# Patient Record
Sex: FEMALE | Race: BLACK OR AFRICAN AMERICAN | NOT HISPANIC OR LATINO | Employment: UNEMPLOYED | URBAN - METROPOLITAN AREA
[De-identification: names, ages, dates, MRNs, and addresses within clinical notes are randomized per-mention and may not be internally consistent; named-entity substitution may affect disease eponyms.]

---
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
## 2020-11-09 PROCEDURE — 99284 EMERGENCY DEPT VISIT MOD MDM: CPT | Mod: ,,, | Performed by: EMERGENCY MEDICINE

## 2020-11-09 PROCEDURE — 99284 PR EMERGENCY DEPT VISIT,LEVEL IV: ICD-10-PCS | Mod: ,,, | Performed by: EMERGENCY MEDICINE

## 2020-11-09 PROCEDURE — 81025 URINE PREGNANCY TEST: CPT | Performed by: EMERGENCY MEDICINE

## 2020-11-09 PROCEDURE — 99284 EMERGENCY DEPT VISIT MOD MDM: CPT

## 2020-11-10 ENCOUNTER — HOSPITAL ENCOUNTER (EMERGENCY)
Facility: HOSPITAL | Age: 20
Discharge: HOME | End: 2020-11-10
Attending: EMERGENCY MEDICINE
Payer: MEDICAID

## 2020-11-10 VITALS
BODY MASS INDEX: 25.2 KG/M2 | DIASTOLIC BLOOD PRESSURE: 55 MMHG | OXYGEN SATURATION: 100 % | HEART RATE: 88 BPM | WEIGHT: 125 LBS | HEIGHT: 59 IN | RESPIRATION RATE: 16 BRPM | TEMPERATURE: 98 F | SYSTOLIC BLOOD PRESSURE: 114 MMHG

## 2020-11-10 DIAGNOSIS — N30.01 ACUTE CYSTITIS WITH HEMATURIA: Primary | ICD-10-CM

## 2020-11-10 LAB
B-HCG UR QL: NEGATIVE
BACTERIA #/AREA URNS AUTO: ABNORMAL /HPF
BILIRUB UR QL STRIP: NEGATIVE
CLARITY UR REFRACT.AUTO: ABNORMAL
COLOR UR AUTO: ABNORMAL
CTP QC/QA: YES
GLUCOSE UR QL STRIP: NEGATIVE
HGB UR QL STRIP: ABNORMAL
HYALINE CASTS UR QL AUTO: 0 /LPF
KETONES UR QL STRIP: NEGATIVE
LEUKOCYTE ESTERASE UR QL STRIP: ABNORMAL
MICROSCOPIC COMMENT: ABNORMAL
NITRITE UR QL STRIP: NEGATIVE
PH UR STRIP: 7 [PH] (ref 5–8)
PROT UR QL STRIP: ABNORMAL
RBC #/AREA URNS AUTO: >100 /HPF (ref 0–4)
SP GR UR STRIP: 1.02 (ref 1–1.03)
SQUAMOUS #/AREA URNS AUTO: 3 /HPF
URN SPEC COLLECT METH UR: ABNORMAL
WBC #/AREA URNS AUTO: 35 /HPF (ref 0–5)

## 2020-11-10 PROCEDURE — 25000003 PHARM REV CODE 250: Performed by: EMERGENCY MEDICINE

## 2020-11-10 PROCEDURE — 87077 CULTURE AEROBIC IDENTIFY: CPT

## 2020-11-10 PROCEDURE — 81001 URINALYSIS AUTO W/SCOPE: CPT

## 2020-11-10 PROCEDURE — 87086 URINE CULTURE/COLONY COUNT: CPT

## 2020-11-10 PROCEDURE — 87186 SC STD MICRODIL/AGAR DIL: CPT

## 2020-11-10 PROCEDURE — 87088 URINE BACTERIA CULTURE: CPT

## 2020-11-10 RX ORDER — PHENAZOPYRIDINE HYDROCHLORIDE 200 MG/1
200 TABLET, FILM COATED ORAL 3 TIMES DAILY
Qty: 6 TABLET | Refills: 0 | Status: SHIPPED | OUTPATIENT
Start: 2020-11-10 | End: 2020-11-12

## 2020-11-10 RX ORDER — SULFAMETHOXAZOLE AND TRIMETHOPRIM 800; 160 MG/1; MG/1
1 TABLET ORAL
Status: COMPLETED | OUTPATIENT
Start: 2020-11-10 | End: 2020-11-10

## 2020-11-10 RX ORDER — SULFAMETHOXAZOLE AND TRIMETHOPRIM 800; 160 MG/1; MG/1
1 TABLET ORAL 2 TIMES DAILY
Qty: 14 TABLET | Refills: 0 | Status: SHIPPED | OUTPATIENT
Start: 2020-11-10 | End: 2020-11-17

## 2020-11-10 RX ORDER — PHENAZOPYRIDINE HYDROCHLORIDE 200 MG/1
200 TABLET, FILM COATED ORAL
Status: COMPLETED | OUTPATIENT
Start: 2020-11-10 | End: 2020-11-10

## 2020-11-10 RX ADMIN — PHENAZOPYRIDINE HYDROCHLORIDE 200 MG: 200 TABLET ORAL at 01:11

## 2020-11-10 RX ADMIN — SULFAMETHOXAZOLE AND TRIMETHOPRIM 1 TABLET: 800; 160 TABLET ORAL at 01:11

## 2020-11-10 NOTE — DISCHARGE INSTRUCTIONS
Drink plenty of fluids  Take medicine as directed, the Bactrim is the antibiotic, and the pyridium will help decrease the pain you experience when you are urinating  Return to the emergency room for fever back pain  Follow up their primary care physician if the symptoms are not better in 2-3 days

## 2020-11-10 NOTE — ED TRIAGE NOTES
Patient arrives via POV from home for urinary frequency and pressure-like pain in her pelvic area.   Prior to arrival meds: none    LOC: The patient is awake, alert and is behaving appropriately.  APPEARANCE: Patient in no acute distress.  SKIN: The skin is warm, dry, and intact, color consistent with ethnicity.   MUSCULOSKELETAL: Patient moving all extremities well, no obvious swelling or deformities noted.   RESPIRATORY: Airway is open and patent, respirations even and unlabored, no accessory muscle use noted. Breath sounds clear. Denies cough  CARDIAC: Patient has a normal rate, no periphreal edema noted, capillary refill < 2 seconds. Pulses 2+.   ABDOMEN: Abdomen soft, non-distended. Bowel sounds active in all quadrants. Denies nausea or vomiting. Denies diarrhea or constipation. Complains of lower abd/pelvic pain and urinary frequency. States her period ended on the 4th but that she has had some spotting since having intercourse on the 7th.   NEUROLOGIC: Awake and alert. PERRL, behavior appropriate to situation, facial expression symmetrical, bilateral hand grasp equal and even, purposeful motor response noted.

## 2020-11-10 NOTE — LETTER
November 16, 2020    Lindsey Sosa  [address] [place] [address]                   [address]  [facility] [address]  Phone: [phone]  Fax: [phone]   Dear Ms. Lindsey Sosa:    Recent results from your visit require your attention. Please call us back at your earliest convenience at [phone].    Sincerely,        Joellen Romo PA-C

## 2020-11-10 NOTE — ED PROVIDER NOTES
Encounter Date: 11/9/2020       History     Chief Complaint   Patient presents with    Dysuria     pt reports pressure with urinating that started today. + lower abdominal pain. denies N/V/D     Chief complaint:  Dysuria    History present illness:  This is a usually healthy 20-year-old young woman who presents emergency room with a several hour history of dysuria, urgency, frequency, and hematuria.  She said she was fine before 7:00 p.m..  She has had no fever.  She denies any back pain.  She denies vaginal discharge.    Last menstrual period was October 30th through the 4th.  She has had intercourse since then but had no problems with intercourse including pain.  Again no vaginal discharge been noted she has had no fever or chills.    Past medical history:  Hospitalizations:  None  Surgeries:  None  Allergies:  None  Medications:  None  Immunizations:  Up-to-date    Social history she has graduated from high school.  She is currently working at [employer].  She denies drug or alcohol use.            Review of patient's allergies indicates:  No Known Allergies  History reviewed. No pertinent past medical history.  History reviewed. No pertinent surgical history.  Family History   Problem Relation Age of Onset    Cancer Neg Hx      Social History     Tobacco Use    Smoking status: Never Smoker    Smokeless tobacco: Never Used   Substance Use Topics    Alcohol use: No    Drug use: Yes     Types: Marijuana     Comment: OCCASIONAL     Review of Systems   Constitutional: Negative for activity change, appetite change, chills and fever.   HENT: Negative for congestion, ear pain, rhinorrhea and sore throat.    Eyes: Negative for pain and discharge.   Respiratory: Negative for cough, shortness of breath and wheezing.    Cardiovascular: Negative for chest pain and palpitations.   Gastrointestinal: Positive for abdominal pain. Negative for diarrhea, nausea and vomiting.        Suprapubic pain   Genitourinary: Positive  for dysuria, frequency, hematuria and urgency. Negative for difficulty urinating, vaginal discharge and vaginal pain.   Musculoskeletal: Negative for arthralgias, back pain and neck pain.   Skin: Negative for rash.   Neurological: Negative for dizziness, weakness and light-headedness.       Physical Exam     Initial Vitals [11/09/20 2351]   BP Pulse Resp Temp SpO2   (!) 114/55 88 16 98.2 °F (36.8 °C) 100 %      MAP       --         Physical Exam    Nursing note and vitals reviewed.  Constitutional: She appears well-developed and well-nourished. She is not diaphoretic. No distress.   HENT:   Head: Normocephalic.   Right Ear: External ear normal.   Left Ear: External ear normal.   Nose: Nose normal.   Mouth/Throat: Oropharynx is clear and moist.   Eyes: Conjunctivae and EOM are normal. Pupils are equal, round, and reactive to light.   Neck: Normal range of motion. Neck supple.   Cardiovascular: Normal rate. Exam reveals no gallop and no friction rub.    No murmur heard.  Pulmonary/Chest: Breath sounds normal. No respiratory distress. She has no wheezes. She has no rhonchi. She has no rales.   Abdominal: Soft. She exhibits no distension and no mass. There is abdominal tenderness. There is no rebound and no guarding.   Suprapubic tenderness to palpation   Musculoskeletal: Normal range of motion. No tenderness or edema.   Neurological: She is alert. She has normal strength. No cranial nerve deficit or sensory deficit. GCS score is 15. GCS eye subscore is 4. GCS verbal subscore is 5. GCS motor subscore is 6.   Skin: Skin is warm and dry. Capillary refill takes less than 2 seconds.         ED Course   Procedures  Labs Reviewed   URINALYSIS, REFLEX TO URINE CULTURE - Abnormal; Notable for the following components:       Result Value    Color, UA Red (*)     Appearance, UA Cloudy (*)     Protein, UA 2+ (*)     Occult Blood UA 3+ (*)     Leukocytes, UA 2+ (*)     All other components within normal limits    Narrative:      Specimen Source->Urine   URINALYSIS MICROSCOPIC - Abnormal; Notable for the following components:    RBC, UA >100 (*)     WBC, UA 35 (*)     All other components within normal limits    Narrative:     Specimen Source->Urine   CULTURE, URINE   POCT URINE PREGNANCY          Imaging Results    None          Medical Decision Making:   Initial Assessment:   Problem 1.:  Dysuria:  History physical is most consistent with urinary tract infection.  Urinalysis reveals greater than 100 red blood cells per high-power field and 35 white blood cells per high-power field, though nitrate negative.  However, given her history, physical exam, and lab findings, I still feel this most likely with UTI and the patient will be treated with Bactrim.  She is given the 1st dose in the emergency room.    I doubt pyelonephritis is an issue given the recent onset of symptoms, lack of back pain, and lack of fever.  Differential Diagnosis:   UTI, vaginal infection, upper tract UTI, vaginitis  Clinical Tests:   Lab Tests: Ordered and Reviewed  The following lab test(s) were unremarkable: UPT                             Clinical Impression:     ICD-10-CM ICD-9-CM   1. Acute cystitis with hematuria  N30.01 595.0                          ED Disposition Condition    Discharge Stable        ED Prescriptions     Medication Sig Dispense Start Date End Date Auth. Provider    sulfamethoxazole-trimethoprim 800-160mg (BACTRIM DS) 800-160 mg Tab Take 1 tablet by mouth 2 (two) times daily. for 7 days 14 tablet 11/10/2020 11/17/2020 Janelle Leyva MD    phenazopyridine (PYRIDIUM) 200 MG tablet Take 1 tablet (200 mg total) by mouth 3 (three) times daily. for 2 days 6 tablet 11/10/2020 11/12/2020 Janelle Leyva MD        Follow-up Information     Follow up With Specialties Details Why Contact Info    Shabnam Bacon MD Pediatrics  As needed, if symptoms no better in 2-3 days [address] [facility]  [address]  [place] [address] [phone]                                          Janelle Leyva MD  11/10/20 0406

## 2020-11-11 LAB — BACTERIA UR CULT: ABNORMAL

## 2020-12-02 ENCOUNTER — HOSPITAL ENCOUNTER (EMERGENCY)
Facility: HOSPITAL | Age: 20
Discharge: HOME | End: 2020-12-02
Attending: INTERNAL MEDICINE
Payer: MEDICAID

## 2020-12-02 VITALS
HEIGHT: 59 IN | OXYGEN SATURATION: 99 % | WEIGHT: 121 LBS | BODY MASS INDEX: 24.39 KG/M2 | HEART RATE: 100 BPM | DIASTOLIC BLOOD PRESSURE: 58 MMHG | SYSTOLIC BLOOD PRESSURE: 112 MMHG | TEMPERATURE: 100 F | RESPIRATION RATE: 18 BRPM

## 2020-12-02 DIAGNOSIS — N10 ACUTE PYELONEPHRITIS: Primary | ICD-10-CM

## 2020-12-02 LAB
B-HCG UR QL: NEGATIVE
BILIRUBIN, POC UA: NEGATIVE
BLOOD, POC UA: ABNORMAL
CLARITY, POC UA: CLEAR
COLOR, POC UA: YELLOW
CTP QC/QA: YES
GLUCOSE, POC UA: NEGATIVE
KETONES, POC UA: ABNORMAL
LEUKOCYTE EST, POC UA: ABNORMAL
NITRITE, POC UA: NEGATIVE
PH UR STRIP: 6 [PH]
PROTEIN, POC UA: NEGATIVE
SPECIFIC GRAVITY, POC UA: 1.02
UROBILINOGEN, POC UA: 0.2 E.U./DL

## 2020-12-02 PROCEDURE — 25000003 PHARM REV CODE 250: Mod: ER | Performed by: INTERNAL MEDICINE

## 2020-12-02 PROCEDURE — 99284 EMERGENCY DEPT VISIT MOD MDM: CPT | Mod: 25,ER

## 2020-12-02 PROCEDURE — 96372 THER/PROPH/DIAG INJ SC/IM: CPT | Mod: ER

## 2020-12-02 PROCEDURE — 81025 URINE PREGNANCY TEST: CPT | Mod: ER | Performed by: INTERNAL MEDICINE

## 2020-12-02 PROCEDURE — 81003 URINALYSIS AUTO W/O SCOPE: CPT | Mod: ER

## 2020-12-02 PROCEDURE — 63600175 PHARM REV CODE 636 W HCPCS: Mod: ER | Performed by: INTERNAL MEDICINE

## 2020-12-02 RX ORDER — SULFAMETHOXAZOLE AND TRIMETHOPRIM 800; 160 MG/1; MG/1
1 TABLET ORAL 2 TIMES DAILY
Qty: 20 TABLET | Refills: 0 | Status: SHIPPED | OUTPATIENT
Start: 2020-12-02 | End: 2020-12-12

## 2020-12-02 RX ORDER — KETOROLAC TROMETHAMINE 10 MG/1
10 TABLET, FILM COATED ORAL 3 TIMES DAILY PRN
Qty: 10 TABLET | Refills: 0 | OUTPATIENT
Start: 2020-12-02 | End: 2021-11-16

## 2020-12-02 RX ORDER — KETOROLAC TROMETHAMINE 10 MG/1
10 TABLET, FILM COATED ORAL
Status: COMPLETED | OUTPATIENT
Start: 2020-12-02 | End: 2020-12-02

## 2020-12-02 RX ORDER — CEFTRIAXONE 1 G/1
1 INJECTION, POWDER, FOR SOLUTION INTRAMUSCULAR; INTRAVENOUS
Status: COMPLETED | OUTPATIENT
Start: 2020-12-02 | End: 2020-12-02

## 2020-12-02 RX ADMIN — CEFTRIAXONE SODIUM 1 G: 1 INJECTION, POWDER, FOR SOLUTION INTRAMUSCULAR; INTRAVENOUS at 05:12

## 2020-12-02 RX ADMIN — KETOROLAC TROMETHAMINE 10 MG: 10 TABLET, FILM COATED ORAL at 06:12

## 2020-12-02 NOTE — ED PROVIDER NOTES
Encounter Date: 12/2/2020    SCRIBE #1 NOTE: I, Sandy Interiano, am scribing for, and in the presence of,  Dr. Huber. I have scribed the following portions of the note - Other sections scribed: HPI, ROS, PE.       History     Chief Complaint   Patient presents with    Flank Pain     left flank pain x 1 week     Lindsey Sosa is a 20 y.o. female who presents to the ED complaining of left sided flank pain for x1 week.    The history is provided by the patient. No  was used.     Review of patient's allergies indicates:  No Known Allergies  History reviewed. No pertinent past medical history.  History reviewed. No pertinent surgical history.  Family History   Problem Relation Age of Onset    Cancer Neg Hx      Social History     Tobacco Use    Smoking status: Never Smoker    Smokeless tobacco: Never Used   Substance Use Topics    Alcohol use: No    Drug use: Yes     Types: Marijuana     Comment: OCCASIONAL     Review of Systems   Constitutional: Negative for chills and fever.   HENT: Negative for sore throat.    Respiratory: Negative for shortness of breath.    Cardiovascular: Negative for chest pain.   Gastrointestinal: Negative for nausea.   Genitourinary: Positive for flank pain. Negative for dysuria.   Musculoskeletal: Negative for back pain.   Skin: Negative for rash.   Neurological: Negative for weakness.   Hematological: Does not bruise/bleed easily.   All other systems reviewed and are negative.      Physical Exam     Initial Vitals [12/02/20 1734]   BP Pulse Resp Temp SpO2   111/63 98 18 99.7 °F (37.6 °C) 96 %      MAP       --         Physical Exam    Nursing note and vitals reviewed.  Constitutional: She appears well-developed and well-nourished.   HENT:   Head: Normocephalic and atraumatic.   Right Ear: External ear normal.   Left Ear: External ear normal.   Eyes: Conjunctivae and EOM are normal.   Neck: Normal range of motion. Neck supple.   Cardiovascular: Normal rate, regular  rhythm and normal heart sounds. Exam reveals no gallop and no friction rub.    No murmur heard.  Pulmonary/Chest: Breath sounds normal. No respiratory distress. She has no wheezes. She has no rhonchi. She has no rales.   Abdominal: Soft. There is no abdominal tenderness.   Musculoskeletal: Tenderness (Mild left CVA tenderness) present. No edema.   Neurological: She is alert and oriented to person, place, and time. GCS score is 15. GCS eye subscore is 4. GCS verbal subscore is 5. GCS motor subscore is 6.   Skin: Skin is warm and dry.   Psychiatric: She has a normal mood and affect.         ED Course   Procedures  Labs Reviewed   POCT URINALYSIS W/O SCOPE - Abnormal; Notable for the following components:       Result Value    Ketones, UA Trace (*)     Blood, UA 1+ (*)     Leukocytes, UA 1+ (*)     All other components within normal limits   POCT URINALYSIS W/O SCOPE   POCT URINE PREGNANCY          Imaging Results    None          Medical Decision Making:   Initial Assessment:   Lindsey Sosa is a 20 y.o. female who presents to the ED complaining of left sided flank pain for x1 week.  Clinical Tests:   Lab Tests: Ordered and Reviewed  ED Management:  Urinalysis reveals signs of infection.  Patient was given instructions for acute pyelonephritis and advised follow up with her primary care physician within the next 3 days for re-evaluation/return to the emergency department if condition worsens.  Prescriptions for Toradol/Bactrim were given and patient received Rocephin/Toradol in the emergency department.            Scribe Attestation:   Scribe #1: I performed the above scribed service and the documentation accurately describes the services I performed. I attest to the accuracy of the note.    This document was produced by a scribe under my direction and in my presence. I agree with the content of the note and have made any necessary edits.     Dr. Huber    12/02/2020 6:07 PM                    Clinical Impression:      ICD-10-CM ICD-9-CM   1. Acute pyelonephritis  N10 590.10                   1. Acute pyelonephritis            ED Disposition Condition    Discharge Stable        ED Prescriptions     Medication Sig Dispense Start Date End Date Auth. Provider    ketorolac (TORADOL) 10 mg tablet Take 1 tablet (10 mg total) by mouth 3 (three) times daily as needed for Pain. 10 tablet 12/2/2020  Abdon Huber MD    sulfamethoxazole-trimethoprim 800-160mg (BACTRIM DS) 800-160 mg Tab Take 1 tablet by mouth 2 (two) times daily. for 10 days 20 tablet 12/2/2020 12/12/2020 Abdon Huber MD        Follow-up Information     Follow up With Specialties Details Why Contact Info    Shabnam Bacon MD Pediatrics Schedule an appointment as soon as possible for a visit in 2 days For reevaluation [address] [facility]  [address] [place] [id number]  [phone]                                         Abdon Huber MD  12/02/20 7121

## 2020-12-02 NOTE — ED NOTES
Pt to ed with c/o L flank pain for one week. Denies trouble urinating. Denies n/v. Denies known trauma. Pt c/o loose bowels for approx 4 days. Denies abdominal pain. resp even and unlabored. Pt states pain increases with movement. Pt has no other complaints. Will continue to monitor

## 2021-01-18 ENCOUNTER — HOSPITAL ENCOUNTER (EMERGENCY)
Facility: HOSPITAL | Age: 21
Discharge: HOME | End: 2021-01-18
Attending: PEDIATRICS
Payer: MEDICAID

## 2021-01-18 VITALS
HEART RATE: 80 BPM | SYSTOLIC BLOOD PRESSURE: 111 MMHG | OXYGEN SATURATION: 97 % | BODY MASS INDEX: 24.39 KG/M2 | TEMPERATURE: 99 F | RESPIRATION RATE: 18 BRPM | HEIGHT: 59 IN | WEIGHT: 121 LBS | DIASTOLIC BLOOD PRESSURE: 52 MMHG

## 2021-01-18 DIAGNOSIS — Z20.2 STD EXPOSURE: Primary | ICD-10-CM

## 2021-01-18 DIAGNOSIS — N89.8 VAGINAL DISCHARGE: ICD-10-CM

## 2021-01-18 LAB
B-HCG UR QL: NEGATIVE
BACTERIA #/AREA URNS AUTO: ABNORMAL /HPF
BACTERIA GENITAL QL WET PREP: ABNORMAL
BILIRUB UR QL STRIP: NEGATIVE
CLARITY UR REFRACT.AUTO: ABNORMAL
CLUE CELLS VAG QL WET PREP: ABNORMAL
COLOR UR AUTO: YELLOW
CTP QC/QA: YES
FILAMENT FUNGI VAG WET PREP-#/AREA: ABNORMAL
GLUCOSE UR QL STRIP: NEGATIVE
HGB UR QL STRIP: NEGATIVE
KETONES UR QL STRIP: NEGATIVE
LEUKOCYTE ESTERASE UR QL STRIP: ABNORMAL
MICROSCOPIC COMMENT: ABNORMAL
NITRITE UR QL STRIP: NEGATIVE
PH UR STRIP: 5 [PH] (ref 5–8)
PROT UR QL STRIP: NEGATIVE
RBC #/AREA URNS AUTO: 3 /HPF (ref 0–4)
SP GR UR STRIP: 1.02 (ref 1–1.03)
SPECIMEN SOURCE: ABNORMAL
SQUAMOUS #/AREA URNS AUTO: 7 /HPF
T VAGINALIS GENITAL QL WET PREP: ABNORMAL
URN SPEC COLLECT METH UR: ABNORMAL
WBC #/AREA URNS AUTO: 48 /HPF (ref 0–5)
WBC #/AREA VAG WET PREP: ABNORMAL
YEAST GENITAL QL WET PREP: ABNORMAL

## 2021-01-18 PROCEDURE — 81025 URINE PREGNANCY TEST: CPT | Performed by: STUDENT IN AN ORGANIZED HEALTH CARE EDUCATION/TRAINING PROGRAM

## 2021-01-18 PROCEDURE — 87086 URINE CULTURE/COLONY COUNT: CPT

## 2021-01-18 PROCEDURE — 99284 EMERGENCY DEPT VISIT MOD MDM: CPT | Mod: 25

## 2021-01-18 PROCEDURE — 63700000 PHARM REV CODE 250 ALT 637 W/O HCPCS: Performed by: STUDENT IN AN ORGANIZED HEALTH CARE EDUCATION/TRAINING PROGRAM

## 2021-01-18 PROCEDURE — 99284 PR EMERGENCY DEPT VISIT,LEVEL IV: ICD-10-PCS | Mod: ,,, | Performed by: PEDIATRICS

## 2021-01-18 PROCEDURE — 63600175 PHARM REV CODE 636 W HCPCS: Performed by: STUDENT IN AN ORGANIZED HEALTH CARE EDUCATION/TRAINING PROGRAM

## 2021-01-18 PROCEDURE — 96372 THER/PROPH/DIAG INJ SC/IM: CPT

## 2021-01-18 PROCEDURE — 99284 EMERGENCY DEPT VISIT MOD MDM: CPT | Mod: ,,, | Performed by: PEDIATRICS

## 2021-01-18 PROCEDURE — 87210 SMEAR WET MOUNT SALINE/INK: CPT

## 2021-01-18 PROCEDURE — 86703 HIV-1/HIV-2 1 RESULT ANTBDY: CPT

## 2021-01-18 PROCEDURE — 81001 URINALYSIS AUTO W/SCOPE: CPT

## 2021-01-18 PROCEDURE — 86592 SYPHILIS TEST NON-TREP QUAL: CPT

## 2021-01-18 RX ORDER — AZITHROMYCIN 250 MG/1
1000 TABLET, FILM COATED ORAL
Status: COMPLETED | OUTPATIENT
Start: 2021-01-18 | End: 2021-01-18

## 2021-01-18 RX ORDER — CEFTRIAXONE 2 G/1
500 INJECTION, POWDER, FOR SOLUTION INTRAMUSCULAR; INTRAVENOUS
Status: COMPLETED | OUTPATIENT
Start: 2021-01-18 | End: 2021-01-18

## 2021-01-18 RX ADMIN — AZITHROMYCIN MONOHYDRATE 1000 MG: 250 TABLET ORAL at 05:01

## 2021-01-18 RX ADMIN — CEFTRIAXONE SODIUM 500 MG: 2 INJECTION, POWDER, FOR SOLUTION INTRAMUSCULAR; INTRAVENOUS at 05:01

## 2021-01-19 LAB
BACTERIA UR CULT: NORMAL
BACTERIA UR CULT: NORMAL
RPR SER QL: NORMAL

## 2021-01-20 LAB — HIV1+2 IGG SERPL QL IA.RAPID: NORMAL

## 2021-07-28 PROCEDURE — 99282 EMERGENCY DEPT VISIT SF MDM: CPT

## 2021-07-28 PROCEDURE — 99282 PR EMERGENCY DEPT VISIT,LEVEL II: ICD-10-PCS | Mod: CS,,, | Performed by: EMERGENCY MEDICINE

## 2021-07-28 PROCEDURE — 99282 EMERGENCY DEPT VISIT SF MDM: CPT | Mod: CS,,, | Performed by: EMERGENCY MEDICINE

## 2021-07-29 ENCOUNTER — HOSPITAL ENCOUNTER (EMERGENCY)
Facility: HOSPITAL | Age: 21
Discharge: HOME | End: 2021-07-29
Attending: EMERGENCY MEDICINE
Payer: MEDICAID

## 2021-07-29 VITALS
OXYGEN SATURATION: 100 % | RESPIRATION RATE: 18 BRPM | SYSTOLIC BLOOD PRESSURE: 124 MMHG | TEMPERATURE: 98 F | HEIGHT: 59 IN | DIASTOLIC BLOOD PRESSURE: 64 MMHG | HEART RATE: 97 BPM | WEIGHT: 124 LBS | BODY MASS INDEX: 25 KG/M2

## 2021-07-29 DIAGNOSIS — Z20.822 COVID-19 VIRUS NOT DETECTED: Primary | ICD-10-CM

## 2021-07-29 LAB
CTP QC/QA: YES
SARS-COV-2 RDRP RESP QL NAA+PROBE: NEGATIVE

## 2021-07-29 PROCEDURE — U0002 COVID-19 LAB TEST NON-CDC: HCPCS | Performed by: EMERGENCY MEDICINE

## 2021-11-15 VITALS
DIASTOLIC BLOOD PRESSURE: 66 MMHG | TEMPERATURE: 99 F | SYSTOLIC BLOOD PRESSURE: 128 MMHG | BODY MASS INDEX: 23.6 KG/M2 | HEART RATE: 78 BPM | OXYGEN SATURATION: 99 % | WEIGHT: 116.88 LBS | RESPIRATION RATE: 14 BRPM

## 2021-11-15 PROCEDURE — 99284 PR EMERGENCY DEPT VISIT,LEVEL IV: ICD-10-PCS | Mod: ,,, | Performed by: EMERGENCY MEDICINE

## 2021-11-15 PROCEDURE — 99284 EMERGENCY DEPT VISIT MOD MDM: CPT | Mod: ,,, | Performed by: EMERGENCY MEDICINE

## 2021-11-15 PROCEDURE — 96372 THER/PROPH/DIAG INJ SC/IM: CPT

## 2021-11-15 PROCEDURE — 99284 EMERGENCY DEPT VISIT MOD MDM: CPT | Mod: 25

## 2021-11-16 ENCOUNTER — HOSPITAL ENCOUNTER (EMERGENCY)
Facility: HOSPITAL | Age: 21
Discharge: HOME | End: 2021-11-16
Attending: EMERGENCY MEDICINE
Payer: MEDICAID

## 2021-11-16 DIAGNOSIS — Z20.2 STD EXPOSURE: Primary | ICD-10-CM

## 2021-11-16 LAB
B-HCG UR QL: NEGATIVE
BACTERIA #/AREA URNS AUTO: ABNORMAL /HPF
BILIRUB UR QL STRIP: NEGATIVE
CLARITY UR REFRACT.AUTO: ABNORMAL
COLOR UR AUTO: YELLOW
CTP QC/QA: YES
GLUCOSE UR QL STRIP: NEGATIVE
HGB UR QL STRIP: NEGATIVE
KETONES UR QL STRIP: ABNORMAL
LEUKOCYTE ESTERASE UR QL STRIP: NEGATIVE
MICROSCOPIC COMMENT: ABNORMAL
NITRITE UR QL STRIP: NEGATIVE
PH UR STRIP: 7 [PH] (ref 5–8)
PROT UR QL STRIP: NEGATIVE
RBC #/AREA URNS AUTO: 5 /HPF (ref 0–4)
SP GR UR STRIP: 1.03 (ref 1–1.03)
SQUAMOUS #/AREA URNS AUTO: 11 /HPF
URN SPEC COLLECT METH UR: ABNORMAL
WBC #/AREA URNS AUTO: 5 /HPF (ref 0–5)

## 2021-11-16 PROCEDURE — 87491 CHLMYD TRACH DNA AMP PROBE: CPT | Performed by: EMERGENCY MEDICINE

## 2021-11-16 PROCEDURE — 63600175 PHARM REV CODE 636 W HCPCS: Performed by: EMERGENCY MEDICINE

## 2021-11-16 PROCEDURE — 87591 N.GONORRHOEAE DNA AMP PROB: CPT | Performed by: EMERGENCY MEDICINE

## 2021-11-16 PROCEDURE — 81001 URINALYSIS AUTO W/SCOPE: CPT | Performed by: EMERGENCY MEDICINE

## 2021-11-16 PROCEDURE — 81025 URINE PREGNANCY TEST: CPT | Performed by: EMERGENCY MEDICINE

## 2021-11-16 RX ORDER — CEFTRIAXONE 500 MG/1
500 INJECTION, POWDER, FOR SOLUTION INTRAMUSCULAR; INTRAVENOUS
Status: COMPLETED | OUTPATIENT
Start: 2021-11-16 | End: 2021-11-16

## 2021-11-16 RX ORDER — DOXYCYCLINE 100 MG/1
100 CAPSULE ORAL 2 TIMES DAILY
Qty: 14 CAPSULE | Refills: 0 | Status: SHIPPED | OUTPATIENT
Start: 2021-11-16 | End: 2021-11-23

## 2021-11-16 RX ADMIN — CEFTRIAXONE SODIUM 500 MG: 500 INJECTION, POWDER, FOR SOLUTION INTRAMUSCULAR; INTRAVENOUS at 01:11

## 2021-11-18 LAB
C TRACH DNA SPEC QL NAA+PROBE: DETECTED
N GONORRHOEA DNA SPEC QL NAA+PROBE: NOT DETECTED

## 2021-11-19 ENCOUNTER — TELEPHONE (OUTPATIENT)
Dept: EMERGENCY MEDICINE | Facility: HOSPITAL | Age: 21
End: 2021-11-19
Payer: MEDICAID

## 2022-03-14 ENCOUNTER — HOSPITAL ENCOUNTER (EMERGENCY)
Facility: HOSPITAL | Age: 22
Discharge: HOME | End: 2022-03-14
Attending: EMERGENCY MEDICINE
Payer: MEDICAID

## 2022-03-14 VITALS
RESPIRATION RATE: 18 BRPM | WEIGHT: 114 LBS | DIASTOLIC BLOOD PRESSURE: 51 MMHG | OXYGEN SATURATION: 99 % | SYSTOLIC BLOOD PRESSURE: 106 MMHG | TEMPERATURE: 98 F | HEART RATE: 83 BPM | BODY MASS INDEX: 23.03 KG/M2

## 2022-03-14 DIAGNOSIS — N76.0 BACTERIAL VAGINITIS: ICD-10-CM

## 2022-03-14 DIAGNOSIS — R10.2 PELVIC PAIN: Primary | ICD-10-CM

## 2022-03-14 DIAGNOSIS — B96.89 BACTERIAL VAGINITIS: ICD-10-CM

## 2022-03-14 LAB
B-HCG UR QL: NEGATIVE
BACTERIA GENITAL QL WET PREP: ABNORMAL
BILIRUB UR QL STRIP: NEGATIVE
CLARITY UR: CLEAR
CLUE CELLS VAG QL WET PREP: ABNORMAL
COLOR UR: YELLOW
CTP QC/QA: YES
FILAMENT FUNGI VAG WET PREP-#/AREA: ABNORMAL
GLUCOSE UR QL STRIP: NEGATIVE
HGB UR QL STRIP: NEGATIVE
KETONES UR QL STRIP: ABNORMAL
LEUKOCYTE ESTERASE UR QL STRIP: NEGATIVE
NITRITE UR QL STRIP: NEGATIVE
PH UR STRIP: 8 [PH] (ref 5–8)
PROT UR QL STRIP: ABNORMAL
SP GR UR STRIP: 1.02 (ref 1–1.03)
SPECIMEN SOURCE: ABNORMAL
T VAGINALIS GENITAL QL WET PREP: ABNORMAL
URN SPEC COLLECT METH UR: ABNORMAL
UROBILINOGEN UR STRIP-ACNC: NEGATIVE EU/DL
WBC #/AREA VAG WET PREP: ABNORMAL
YEAST GENITAL QL WET PREP: ABNORMAL

## 2022-03-14 PROCEDURE — 81003 URINALYSIS AUTO W/O SCOPE: CPT | Performed by: PHYSICIAN ASSISTANT

## 2022-03-14 PROCEDURE — 87591 N.GONORRHOEAE DNA AMP PROB: CPT | Performed by: PHYSICIAN ASSISTANT

## 2022-03-14 PROCEDURE — 96372 THER/PROPH/DIAG INJ SC/IM: CPT | Performed by: PHYSICIAN ASSISTANT

## 2022-03-14 PROCEDURE — 87210 SMEAR WET MOUNT SALINE/INK: CPT | Performed by: PHYSICIAN ASSISTANT

## 2022-03-14 PROCEDURE — 25000003 PHARM REV CODE 250: Performed by: PHYSICIAN ASSISTANT

## 2022-03-14 PROCEDURE — 99284 EMERGENCY DEPT VISIT MOD MDM: CPT | Mod: 25

## 2022-03-14 PROCEDURE — 63600175 PHARM REV CODE 636 W HCPCS: Performed by: PHYSICIAN ASSISTANT

## 2022-03-14 PROCEDURE — 87491 CHLMYD TRACH DNA AMP PROBE: CPT | Performed by: PHYSICIAN ASSISTANT

## 2022-03-14 PROCEDURE — 81025 URINE PREGNANCY TEST: CPT | Performed by: PHYSICIAN ASSISTANT

## 2022-03-14 RX ORDER — METRONIDAZOLE 500 MG/1
500 TABLET ORAL EVERY 12 HOURS
Qty: 14 TABLET | Refills: 0 | Status: SHIPPED | OUTPATIENT
Start: 2022-03-14 | End: 2022-03-21

## 2022-03-14 RX ORDER — DOXYCYCLINE HYCLATE 100 MG
100 TABLET ORAL
Status: COMPLETED | OUTPATIENT
Start: 2022-03-14 | End: 2022-03-14

## 2022-03-14 RX ORDER — PHENAZOPYRIDINE HYDROCHLORIDE 200 MG/1
200 TABLET, FILM COATED ORAL
Qty: 12 TABLET | Refills: 0 | Status: SHIPPED | OUTPATIENT
Start: 2022-03-14 | End: 2022-03-18

## 2022-03-14 RX ORDER — DOXYCYCLINE 100 MG/1
100 CAPSULE ORAL 2 TIMES DAILY
Qty: 14 CAPSULE | Refills: 0 | Status: SHIPPED | OUTPATIENT
Start: 2022-03-14 | End: 2022-03-21

## 2022-03-14 RX ORDER — LIDOCAINE HYDROCHLORIDE 10 MG/ML
10 INJECTION INFILTRATION; PERINEURAL
Status: COMPLETED | OUTPATIENT
Start: 2022-03-14 | End: 2022-03-14

## 2022-03-14 RX ORDER — CEFTRIAXONE 1 G/1
1 INJECTION, POWDER, FOR SOLUTION INTRAMUSCULAR; INTRAVENOUS
Status: COMPLETED | OUTPATIENT
Start: 2022-03-14 | End: 2022-03-14

## 2022-03-14 RX ADMIN — LIDOCAINE HYDROCHLORIDE 10 ML: 10 INJECTION, SOLUTION INFILTRATION; PERINEURAL at 04:03

## 2022-03-14 RX ADMIN — CEFTRIAXONE 1 G: 1 INJECTION, POWDER, FOR SOLUTION INTRAMUSCULAR; INTRAVENOUS at 04:03

## 2022-03-14 RX ADMIN — DOXYCYCLINE HYCLATE 100 MG: 100 TABLET, COATED ORAL at 04:03

## 2022-03-14 NOTE — Clinical Note
"Lindsey OBREGON"Lacey Sosa was seen and treated in our emergency department on 3/14/2022.  She may return to work on 03/14/2022.       If you have any questions or concerns, please don't hesitate to call.      Mitra ACOSTA    "

## 2022-03-14 NOTE — DISCHARGE INSTRUCTIONS

## 2022-03-14 NOTE — ED PROVIDER NOTES
Encounter Date: 3/14/2022    SCRIBE #1 NOTE: I, Isi Miller, am scribing for, and in the presence of,  Davon Oliver PA-C. I have scribed the following portions of the note - Other sections scribed: HPI, ROS, PE.       History     Chief Complaint   Patient presents with    Abdominal Pain     Patient has been abd pain for two weeks.  Patient states the pain got worse after intercourse.  Positive for vaginal discharge, no vaginal bleeding.      Lindsey Sosa is a 22 y.o female with no pertinent PMHx presenting to the ED with a chief complaint of abdominal pain onset one week ago. The patient complains of generalized lower abdominal pain with associated frequency and increased pain during sexual intercourse. States she was diagnosed with Chlamydia in January and was prescribed Azithromycin. Denies concern for STD's at this time and additionally denies new sexual partners. No past bladder infections. Patient denies vaginal discharge, dysuria, fever, nausea, and vomiting. No other associated symptoms.     The history is provided by the patient. No  was used.     Review of patient's allergies indicates:  No Known Allergies  History reviewed. No pertinent past medical history.  History reviewed. No pertinent surgical history.  Family History   Problem Relation Age of Onset    Cancer Neg Hx      Social History     Tobacco Use    Smoking status: Current Every Day Smoker    Smokeless tobacco: Never Used    Tobacco comment: black and milds daily   Substance Use Topics    Alcohol use: Yes     Comment: occasionally    Drug use: Yes     Types: Marijuana     Comment: daily     Review of Systems   Constitutional: Negative for fever.   HENT: Negative for sore throat.    Eyes: Negative for visual disturbance.   Respiratory: Negative for shortness of breath.    Cardiovascular: Negative for chest pain.   Gastrointestinal: Positive for abdominal pain. Negative for nausea and vomiting.   Genitourinary:  Positive for frequency. Negative for dysuria and vaginal discharge.   Musculoskeletal: Negative for back pain.   Skin: Negative for rash.   Neurological: Negative for headaches.   All other systems reviewed and are negative.      Physical Exam     Initial Vitals [03/14/22 1530]   BP Pulse Resp Temp SpO2   (!) 109/51 89 18 98.3 °F (36.8 °C) 100 %      MAP       --         Physical Exam    Nursing note and vitals reviewed.  Constitutional: She appears well-developed and well-nourished. She is not diaphoretic. No distress.   HENT:   Head: Normocephalic and atraumatic.   Nose: Nose normal.   Eyes: Conjunctivae and EOM are normal. Right eye exhibits no discharge. Left eye exhibits no discharge.   Neck: No tracheal deviation present. No JVD present.   Normal range of motion.  Cardiovascular: Normal rate, regular rhythm and normal heart sounds. Exam reveals no friction rub.    No murmur heard.  Pulmonary/Chest: Breath sounds normal. No stridor. No respiratory distress. She has no wheezes. She has no rhonchi. She has no rales. She exhibits no tenderness.   Abdominal: Abdomen is soft. Bowel sounds are normal. She exhibits no distension. There is no abdominal tenderness. There is no rebound, no guarding, no tenderness at McBurney's point and negative Middleton's sign.   Genitourinary:    Genitourinary Comments: Performed by [facility]. Thick white vaginal discharge. No friability. No CMT or adnexal TTP.      Musculoskeletal:         General: No tenderness or edema. Normal range of motion.      Cervical back: Normal range of motion.     Neurological: She is alert and oriented to person, place, and time.   Skin: Skin is warm and dry. No rash and no abscess noted. No erythema. No pallor.   Psychiatric: She has a normal mood and affect. Thought content normal.         ED Course   Procedures  Labs Reviewed   VAGINAL SCREEN - Abnormal; Notable for the following components:       Result Value    Clue Cells Occasional (*)      WBC - Vaginal Screen Many (*)     Bacteria - Vaginal Screen Many (*)     All other components within normal limits    Narrative:     Release to patient->Immediate   URINALYSIS, REFLEX TO URINE CULTURE - Abnormal; Notable for the following components:    Protein, UA Trace (*)     Ketones, UA Trace (*)     All other components within normal limits    Narrative:     Specimen Source->Urine   C. TRACHOMATIS/N. GONORRHOEAE BY AMP DNA   C. TRACHOMATIS/N. GONORRHOEAE BY AMP DNA   POCT URINE PREGNANCY          Imaging Results    None          Medications   cefTRIAXone injection 1 g (1 g Intramuscular Given 3/14/22 1639)   LIDOcaine HCL 10 mg/ml (1%) injection 10 mL (10 mLs Infiltration Given 3/14/22 1639)   doxycycline tablet 100 mg (100 mg Oral Given 3/14/22 1639)     Medical Decision Making:   History:   Old Medical Records: I decided to obtain old medical records.  Clinical Tests:   Lab Tests: Ordered and Reviewed  ED Management:  Chlamydia positive 4-5 months ago and treated with azithromycin. No doxycyline use; will repeat GC for possible azithromycin resistance.  No UTI but does endorse urinary symptoms; may have urethritis.  No clinical evidence of PID but given duration of symptoms and recent history of STD, I will treat empirically for possible early/developing PID.  No convincing evidence for TOA.  No systemic symptoms.  Nonsurgical abdomen.  Also appears to have BV. Advising close follow-up with OBGYN.  We discussed safe sexual practices.  Strict return precautions discussed with patient who is agreeable to the plan.          Scribe Attestation:   Scribe #1: I performed the above scribed service and the documentation accurately describes the services I performed. I attest to the accuracy of the note.                 Clinical Impression:   Final diagnoses:  [R10.2] Pelvic pain (Primary)  [N76.0, B96.89] Bacterial vaginitis        I, Davon Oliver PA-C, personally performed the services described in this  documentation. All medical record entries made by the scribe were at my direction and in my presence. I have reviewed the chart and agree that the record reflects my personal performance and is accurate and complete.       ED Disposition Condition    Discharge Stable        ED Prescriptions     Medication Sig Dispense Start Date End Date Auth. Provider    metroNIDAZOLE (FLAGYL) 500 MG tablet Take 1 tablet (500 mg total) by mouth every 12 (twelve) hours. for 7 days 14 tablet 3/14/2022 3/21/2022 Davon Oliver PA-C    doxycycline (VIBRAMYCIN) 100 MG Cap Take 1 capsule (100 mg total) by mouth 2 (two) times daily. for 7 days 14 capsule 3/14/2022 3/21/2022 Davon Oliver PA-C    phenazopyridine (PYRIDIUM) 200 MG tablet Take 1 tablet (200 mg total) by mouth 3 (three) times daily with meals. for 4 days 12 tablet 3/14/2022 3/18/2022 Davon Oliver PA-C        Follow-up Information     Follow up With Specialties Details Why Contact Info    Fili Christensen MD Obstetrics and Gynecology Schedule an appointment as soon as possible for a visit in 1 day For further evaluation, For more definitive management of your symptoms [address]  [address]  [facility] [address]  [phone]      [facility] Emergency Dept Emergency Medicine Go to  If symptoms worsen [address]  [facility] [id number] [phone]           Davon Oliver PA-C  03/14/22 6273

## 2022-03-17 LAB
C TRACH DNA SPEC QL NAA+PROBE: DETECTED
C TRACH DNA SPEC QL NAA+PROBE: DETECTED
N GONORRHOEA DNA SPEC QL NAA+PROBE: NOT DETECTED
N GONORRHOEA DNA SPEC QL NAA+PROBE: NOT DETECTED

## 2022-05-10 ENCOUNTER — HOSPITAL ENCOUNTER (EMERGENCY)
Facility: HOSPITAL | Age: 22
Discharge: HOME | End: 2022-05-10
Attending: EMERGENCY MEDICINE
Payer: MEDICAID

## 2022-05-10 VITALS
HEIGHT: 59 IN | OXYGEN SATURATION: 99 % | BODY MASS INDEX: 23.79 KG/M2 | WEIGHT: 118 LBS | DIASTOLIC BLOOD PRESSURE: 59 MMHG | TEMPERATURE: 99 F | HEART RATE: 103 BPM | SYSTOLIC BLOOD PRESSURE: 112 MMHG | RESPIRATION RATE: 17 BRPM

## 2022-05-10 DIAGNOSIS — J06.9 VIRAL URI WITH COUGH: Primary | ICD-10-CM

## 2022-05-10 LAB
B-HCG UR QL: POSITIVE
CTP QC/QA: YES
POC MOLECULAR INFLUENZA A AGN: NEGATIVE
POC MOLECULAR INFLUENZA B AGN: NEGATIVE
SARS-COV-2 RDRP RESP QL NAA+PROBE: NEGATIVE

## 2022-05-10 PROCEDURE — U0003 INFECTIOUS AGENT DETECTION BY NUCLEIC ACID (DNA OR RNA); SEVERE ACUTE RESPIRATORY SYNDROME CORONAVIRUS 2 (SARS-COV-2) (CORONAVIRUS DISEASE [COVID-19]), AMPLIFIED PROBE TECHNIQUE, MAKING USE OF HIGH THROUGHPUT TECHNOLOGIES AS DESCRIBED BY CMS-2020-01-R: HCPCS | Performed by: EMERGENCY MEDICINE

## 2022-05-10 PROCEDURE — 87502 INFLUENZA DNA AMP PROBE: CPT

## 2022-05-10 PROCEDURE — 99283 EMERGENCY DEPT VISIT LOW MDM: CPT | Mod: 25

## 2022-05-10 PROCEDURE — 81025 URINE PREGNANCY TEST: CPT | Performed by: EMERGENCY MEDICINE

## 2022-05-10 PROCEDURE — U0005 INFEC AGEN DETEC AMPLI PROBE: HCPCS | Performed by: EMERGENCY MEDICINE

## 2022-05-10 PROCEDURE — U0002 COVID-19 LAB TEST NON-CDC: HCPCS | Performed by: PHYSICIAN ASSISTANT

## 2022-05-10 RX ORDER — GUAIFENESIN 100 MG/5ML
100-200 SOLUTION ORAL EVERY 4 HOURS PRN
Qty: 118 ML | Refills: 0 | Status: SHIPPED | OUTPATIENT
Start: 2022-05-10 | End: 2022-05-20

## 2022-05-10 NOTE — DISCHARGE INSTRUCTIONS
Please follow-up with your OBGYN regarding further management of your cold.  Take medication as prescribed.  Only take Tylenol in addition to the cough medication that I am prescribing to you.

## 2022-05-10 NOTE — ED PROVIDER NOTES
Encounter Date: 5/10/2022       History     Chief Complaint   Patient presents with    Cough     Pt reports to the ED with C/O cough and generalized body aches x 3 days. Pt reports a dry cough with chest wall soreness when coughing. Pt is 11 weeks pregnant. Pt denies any ABD pain, N/V, or vag bleeding. Pt AAOx4, RESP easy and unlabored. Pt awaiting QT bed for eval.      22-year-old  approximately 11 weeks pregnant female presents ER for evaluation of cough and congestion.  Patient reports that she has had cough, sore throat and body aches for the last 3 days.  She denies any associated shortness of breath.  No abdominal pain, nausea vomiting or diarrhea.  No flank pain.  She denies any vaginal bleeding or discharge.  No pelvic pain.  She states that she took OTC medication with only slight alleviation of symptoms.  Patient reports she is currently followed by OBGYN and had ultrasound to confirm IUP.    The history is provided by the patient.     Review of patient's allergies indicates:  No Known Allergies  History reviewed. No pertinent past medical history.  History reviewed. No pertinent surgical history.  Family History   Problem Relation Age of Onset    Cancer Neg Hx      Social History     Tobacco Use    Smoking status: Current Every Day Smoker    Smokeless tobacco: Never Used    Tobacco comment: black and milds daily   Substance Use Topics    Alcohol use: Yes     Comment: occasionally    Drug use: Yes     Types: Marijuana     Comment: daily     Review of Systems   Constitutional: Positive for chills. Negative for fever.   HENT: Positive for congestion.    Eyes: Negative for visual disturbance.   Respiratory: Negative for shortness of breath.    Cardiovascular: Negative for chest pain.   Gastrointestinal: Negative for nausea and vomiting.   Genitourinary: Negative for dysuria and flank pain.   Musculoskeletal: Positive for myalgias.   Skin: Negative for rash.   Allergic/Immunologic: Negative for  immunocompromised state.   Neurological: Negative for weakness and numbness.   Hematological: Does not bruise/bleed easily.   Psychiatric/Behavioral: Negative for confusion.       Physical Exam     Initial Vitals [05/10/22 1529]   BP Pulse Resp Temp SpO2   (!) 104/54 106 16 98.9 °F (37.2 °C) 99 %      MAP       --         Physical Exam    Vitals reviewed.  Constitutional: She appears well-developed and well-nourished. She is not diaphoretic. No distress.   HENT:   Head: Normocephalic and atraumatic.   Mouth/Throat: Uvula is midline and mucous membranes are normal. Posterior oropharyngeal erythema (Mild) present.   Eyes: Conjunctivae and EOM are normal.   Neck: Neck supple.   Cardiovascular: Normal rate, regular rhythm, normal heart sounds and intact distal pulses.   Pulmonary/Chest: Breath sounds normal. No respiratory distress. She has no wheezes.   Abdominal: Abdomen is soft. She exhibits no distension. There is no abdominal tenderness.   Musculoskeletal:         General: Normal range of motion.      Cervical back: Neck supple.     Neurological: She is alert and oriented to person, place, and time.   Skin: Skin is warm.         ED Course   Procedures  Labs Reviewed   POCT URINE PREGNANCY - Abnormal; Notable for the following components:       Result Value    POC Preg Test, Ur Positive (*)     All other components within normal limits   SARS-COV-2 (COVID-19) QUALITATIVE PCR   POCT INFLUENZA A/B MOLECULAR   SARS-COV-2 RDRP GENE          Imaging Results    None          Medications - No data to display        APC / Resident Notes:   Patient seen in the ER promptly upon arrival.  She is afebrile, no acute distress.  Heart and lung sounds are normal.  Oropharynx slightly erythematous.  No exudate.  Uvula midline.  TM bilaterally unremarkable.  Fetal heart tone of 147 beats per minute.    COVID, flu test negative.  Suspect symptoms secondary to viral etiology, upper respiratory infection.  Will prescribed home on  Robitussin.  Patient was advised to only take Tylenol in addition.  Advised nasal saline for stuffy nose.  Patient is to follow up with her OBGYN for further management of her URI symptoms.  Given strict return precautions ED.  Stable for discharge and close follow-up at this time.    Disclaimer: This note has been generated using voice-recognition software. There may be typographical errors that have been missed during proof-reading.                   Clinical Impression:   Final diagnoses:  [J06.9] Viral URI with cough (Primary)          ED Disposition Condition    Discharge Stable        ED Prescriptions     Medication Sig Dispense Start Date End Date Auth. Provider    guaiFENesin 100 mg/5 ml (ROBITUSSIN) 100 mg/5 mL syrup Take 5-10 mLs (100-200 mg total) by mouth every 4 (four) hours as needed for Cough. 118 mL 5/10/2022 5/20/2022 Taylor Kuhn PA-C        Follow-up Information    None          Taylor Kuhn PA-C  05/10/22 [id number]

## 2022-05-11 LAB
SARS-COV-2 RNA RESP QL NAA+PROBE: NOT DETECTED
SARS-COV-2- CYCLE NUMBER: NORMAL

## 2022-08-02 ENCOUNTER — HOSPITAL ENCOUNTER (EMERGENCY)
Facility: HOSPITAL | Age: 22
Discharge: HOME | End: 2022-08-02
Attending: EMERGENCY MEDICINE
Payer: MEDICAID

## 2022-08-02 VITALS
HEIGHT: 59 IN | SYSTOLIC BLOOD PRESSURE: 108 MMHG | BODY MASS INDEX: 26.21 KG/M2 | HEART RATE: 83 BPM | DIASTOLIC BLOOD PRESSURE: 53 MMHG | OXYGEN SATURATION: 100 % | RESPIRATION RATE: 25 BRPM | TEMPERATURE: 99 F | WEIGHT: 130 LBS

## 2022-08-02 DIAGNOSIS — R42 LIGHTHEADEDNESS: Primary | ICD-10-CM

## 2022-08-02 LAB
ANION GAP SERPL CALC-SCNC: 7 MMOL/L (ref 8–16)
B-HCG UR QL: POSITIVE
BACTERIA #/AREA URNS HPF: ABNORMAL /HPF
BASOPHILS # BLD AUTO: 0.02 K/UL (ref 0–0.2)
BASOPHILS NFR BLD: 0.3 % (ref 0–1.9)
BILIRUB UR QL STRIP: NEGATIVE
BUN SERPL-MCNC: 7 MG/DL (ref 6–20)
CALCIUM SERPL-MCNC: 8.7 MG/DL (ref 8.7–10.5)
CHLORIDE SERPL-SCNC: 105 MMOL/L (ref 95–110)
CLARITY UR: ABNORMAL
CO2 SERPL-SCNC: 25 MMOL/L (ref 23–29)
COLOR UR: YELLOW
CREAT SERPL-MCNC: 0.7 MG/DL (ref 0.5–1.4)
CTP QC/QA: YES
DIFFERENTIAL METHOD: ABNORMAL
EOSINOPHIL # BLD AUTO: 0.1 K/UL (ref 0–0.5)
EOSINOPHIL NFR BLD: 1.8 % (ref 0–8)
ERYTHROCYTE [DISTWIDTH] IN BLOOD BY AUTOMATED COUNT: 13.6 % (ref 11.5–14.5)
EST. GFR  (NO RACE VARIABLE): >60 ML/MIN/1.73 M^2
GLUCOSE SERPL-MCNC: 90 MG/DL (ref 70–110)
GLUCOSE UR QL STRIP: NEGATIVE
HCT VFR BLD AUTO: 31.2 % (ref 37–48.5)
HGB BLD-MCNC: 10.6 G/DL (ref 12–16)
HGB UR QL STRIP: NEGATIVE
IMM GRANULOCYTES # BLD AUTO: 0.03 K/UL (ref 0–0.04)
IMM GRANULOCYTES NFR BLD AUTO: 0.5 % (ref 0–0.5)
KETONES UR QL STRIP: NEGATIVE
LEUKOCYTE ESTERASE UR QL STRIP: ABNORMAL
LYMPHOCYTES # BLD AUTO: 1.4 K/UL (ref 1–4.8)
LYMPHOCYTES NFR BLD: 22.7 % (ref 18–48)
MCH RBC QN AUTO: 28.3 PG (ref 27–31)
MCHC RBC AUTO-ENTMCNC: 34 G/DL (ref 32–36)
MCV RBC AUTO: 83 FL (ref 82–98)
MICROSCOPIC COMMENT: ABNORMAL
MONOCYTES # BLD AUTO: 0.6 K/UL (ref 0.3–1)
MONOCYTES NFR BLD: 9.5 % (ref 4–15)
NEUTROPHILS # BLD AUTO: 4 K/UL (ref 1.8–7.7)
NEUTROPHILS NFR BLD: 65.2 % (ref 38–73)
NITRITE UR QL STRIP: NEGATIVE
NRBC BLD-RTO: 0 /100 WBC
PH UR STRIP: 7 [PH] (ref 5–8)
PLATELET # BLD AUTO: 187 K/UL (ref 150–450)
PMV BLD AUTO: 11.7 FL (ref 9.2–12.9)
POTASSIUM SERPL-SCNC: 3.4 MMOL/L (ref 3.5–5.1)
PROT UR QL STRIP: NEGATIVE
RBC # BLD AUTO: 3.74 M/UL (ref 4–5.4)
RBC #/AREA URNS HPF: 2 /HPF (ref 0–4)
SODIUM SERPL-SCNC: 137 MMOL/L (ref 136–145)
SP GR UR STRIP: 1.01 (ref 1–1.03)
SQUAMOUS #/AREA URNS HPF: 17 /HPF
URN SPEC COLLECT METH UR: ABNORMAL
UROBILINOGEN UR STRIP-ACNC: NEGATIVE EU/DL
WBC # BLD AUTO: 6.12 K/UL (ref 3.9–12.7)
WBC #/AREA URNS HPF: 14 /HPF (ref 0–5)

## 2022-08-02 PROCEDURE — 99284 EMERGENCY DEPT VISIT MOD MDM: CPT | Mod: 25

## 2022-08-02 PROCEDURE — 81025 URINE PREGNANCY TEST: CPT | Performed by: EMERGENCY MEDICINE

## 2022-08-02 PROCEDURE — 85025 COMPLETE CBC W/AUTO DIFF WBC: CPT | Performed by: EMERGENCY MEDICINE

## 2022-08-02 PROCEDURE — 80048 BASIC METABOLIC PNL TOTAL CA: CPT | Performed by: EMERGENCY MEDICINE

## 2022-08-02 PROCEDURE — 96360 HYDRATION IV INFUSION INIT: CPT

## 2022-08-02 PROCEDURE — 63600175 PHARM REV CODE 636 W HCPCS: Performed by: EMERGENCY MEDICINE

## 2022-08-02 PROCEDURE — 81000 URINALYSIS NONAUTO W/SCOPE: CPT | Performed by: EMERGENCY MEDICINE

## 2022-08-02 PROCEDURE — 87086 URINE CULTURE/COLONY COUNT: CPT | Performed by: EMERGENCY MEDICINE

## 2022-08-02 RX ADMIN — SODIUM CHLORIDE, SODIUM LACTATE, POTASSIUM CHLORIDE, AND CALCIUM CHLORIDE 1000 ML: .6; .31; .03; .02 INJECTION, SOLUTION INTRAVENOUS at 12:08

## 2022-08-02 NOTE — ED TRIAGE NOTES
Pt comes in w/complaint of dizziness. Pt is 24wks pg and does receive prenatal care. Pt describes 2 episodes one on 7/31 and another earlier today. Pt skin is WDI, resp EU, answers questions appropriately, follows instruction, denies NVD, SOB  Or CP. Ambulatory w/steady gait.

## 2022-08-02 NOTE — ED PROVIDER NOTES
Encounter Date: 2022       History     Chief Complaint   Patient presents with    Dizziness     Pt reports lightheadedness and dizziness x 1 day. Pt is 5 months pregnant. A1. Denies any abdominal pain or vaginal bleeding      The patient is a 22-year-old who is currently five months pregnant.  She complains of intermittent lightheadedness that began this morning.  She had a similar episode two days ago that resolved without treatment.  She denies history of vertigo.  She denies headache, changes in her vision, hearing loss, tinnitus, chest pain, palpitations, shortness of breath, abdominal pain, nausea, vomiting, diarrhea, dysuria, difficulty urinating, vaginal bleeding, and vaginal discharge.  This is her second pregnancy.  Her first pregnancy and did in miscarriage at around 20 weeks.  She has had prenatal care her pregnancy has been confirmed by ultrasound.  She primarily notices this lightheadedness when she stands and ambulates.    She has no past medical history on file.    The history is provided by the patient. No  was used.     Review of patient's allergies indicates:  No Known Allergies  No past medical history on file.  No past surgical history on file.  Family History   Problem Relation Age of Onset    Cancer Neg Hx      Social History     Tobacco Use    Smoking status: Current Every Day Smoker    Smokeless tobacco: Never Used    Tobacco comment: black and milds daily   Substance Use Topics    Alcohol use: Yes     Comment: occasionally    Drug use: Yes     Types: Marijuana     Comment: daily     Review of Systems   Constitutional: Negative for chills and fever.   HENT: Negative for ear pain and hearing loss.    Eyes: Negative for visual disturbance.   Respiratory: Negative for shortness of breath.    Cardiovascular: Negative for chest pain and palpitations.   Gastrointestinal: Negative for abdominal pain, diarrhea, nausea and vomiting.   Genitourinary: Negative for  decreased urine volume, difficulty urinating, dysuria, hematuria, vaginal bleeding and vaginal discharge.   Musculoskeletal: Negative for arthralgias and myalgias.   Neurological: Positive for light-headedness. Negative for syncope, weakness, numbness and headaches.       Physical Exam     Initial Vitals [08/02/22 1229]   BP Pulse Resp Temp SpO2   (!) 121/57 77 20 98.6 °F (37 °C) 100 %      MAP       --         Physical Exam    Nursing note and vitals reviewed.  Constitutional: She is not diaphoretic. No distress.   HENT:   Head: Normocephalic and atraumatic.   Eyes: Conjunctivae are normal. Pupils are equal, round, and reactive to light.   Cardiovascular: Normal rate, regular rhythm and normal heart sounds.   No murmur heard.  Pulmonary/Chest: No respiratory distress. She has no wheezes. She has no rhonchi. She has no rales.   Abdominal: Abdomen is soft. She exhibits no distension. There is no abdominal tenderness.     Neurological: She is alert and oriented to person, place, and time. GCS score is 15. GCS eye subscore is 4. GCS verbal subscore is 5. GCS motor subscore is 6.   Skin: Skin is warm and dry. No pallor.         ED Course   Procedures  Labs Reviewed   CBC W/ AUTO DIFFERENTIAL - Abnormal; Notable for the following components:       Result Value    RBC 3.74 (*)     Hemoglobin 10.6 (*)     Hematocrit 31.2 (*)     All other components within normal limits   BASIC METABOLIC PANEL - Abnormal; Notable for the following components:    Potassium 3.4 (*)     Anion Gap 7 (*)     All other components within normal limits   URINALYSIS, REFLEX TO URINE CULTURE - Abnormal; Notable for the following components:    Appearance, UA Hazy (*)     Leukocytes, UA 3+ (*)     All other components within normal limits    Narrative:     Specimen Source->Urine   URINALYSIS MICROSCOPIC - Abnormal; Notable for the following components:    WBC, UA 14 (*)     All other components within normal limits    Narrative:     Specimen  Source->Urine   POCT URINE PREGNANCY - Abnormal; Notable for the following components:    POC Preg Test, Ur Positive (*)     All other components within normal limits   CULTURE, URINE          Imaging Results    None          Medications   lactated ringers bolus 1,000 mL (0 mLs Intravenous Stopped 8/2/22 1343)     Medical Decision Making:   History:   Old Medical Records: I decided to obtain old medical records.  Old Records Summarized: other records.       <> Summary of Records: Past medical history reviewed as above.  Clinical Tests:   Lab Tests: Ordered and Reviewed    MDM:  This was an urgent evaluation.  The patient complained of intermittent lightheadedness for a few days.  She was afebrile with stable vital signs.  She did not appear to be clinically dehydrated.  She had a normal neurological examination.  There are no concerning findings all lab review.  Her condition improved with IV fluids she was discharged.             ED Course as of 08/02/22 1501   Tue Aug 02, 2022   1356 She is feeling better. [LP]   1426 3+ leukocytes with 14 WBC/hpf and 17 squamous cells on UA. Pt is not having symptoms of UTI so these findings are likely due to specimen collection technique. [LP]      ED Course User Index  [LP] Eliu Moore III, MD             Clinical Impression:   Final diagnoses:  [R42] Lightheadedness (Primary)          ED Disposition Condition    Discharge Stable        ED Prescriptions     None        Follow-up Information     Follow up With Specialties Details Why Contact Info    Your obstetrician  Schedule an appointment as soon as possible for a visit       ER   Return to this ER or visit any other ER should you have any concerns that you feel need immediate attention.            Eliu Moore III, MD  08/02/22 4902

## 2022-08-04 LAB — BACTERIA UR CULT: NORMAL

## 2022-09-01 ENCOUNTER — HOSPITAL ENCOUNTER (EMERGENCY)
Facility: HOSPITAL | Age: 22
Discharge: HOME | End: 2022-09-01
Attending: STUDENT IN AN ORGANIZED HEALTH CARE EDUCATION/TRAINING PROGRAM
Payer: MEDICAID

## 2022-09-01 VITALS
HEART RATE: 97 BPM | DIASTOLIC BLOOD PRESSURE: 66 MMHG | BODY MASS INDEX: 27.21 KG/M2 | HEIGHT: 59 IN | OXYGEN SATURATION: 96 % | RESPIRATION RATE: 19 BRPM | SYSTOLIC BLOOD PRESSURE: 130 MMHG | WEIGHT: 135 LBS | TEMPERATURE: 99 F

## 2022-09-01 DIAGNOSIS — R42 ORTHOSTATIC DIZZINESS: Primary | ICD-10-CM

## 2022-09-01 DIAGNOSIS — R09.81 NASAL CONGESTION: ICD-10-CM

## 2022-09-01 DIAGNOSIS — R05.9 COUGH: ICD-10-CM

## 2022-09-01 DIAGNOSIS — R09.82 POST-NASAL DRIP: ICD-10-CM

## 2022-09-01 DIAGNOSIS — Z3A.27 27 WEEKS GESTATION OF PREGNANCY: ICD-10-CM

## 2022-09-01 DIAGNOSIS — E86.0 MILD DEHYDRATION: ICD-10-CM

## 2022-09-01 LAB
CTP QC/QA: YES
MOLECULAR STREP A: NEGATIVE
POC MOLECULAR INFLUENZA A AGN: NEGATIVE
POC MOLECULAR INFLUENZA B AGN: NEGATIVE
POCT GLUCOSE: 104 MG/DL (ref 70–110)
SARS-COV-2 RDRP RESP QL NAA+PROBE: NEGATIVE

## 2022-09-01 PROCEDURE — 93010 EKG 12-LEAD: ICD-10-PCS | Mod: ,,, | Performed by: INTERNAL MEDICINE

## 2022-09-01 PROCEDURE — 82962 GLUCOSE BLOOD TEST: CPT

## 2022-09-01 PROCEDURE — 93005 ELECTROCARDIOGRAM TRACING: CPT

## 2022-09-01 PROCEDURE — 93010 ELECTROCARDIOGRAM REPORT: CPT | Mod: ,,, | Performed by: INTERNAL MEDICINE

## 2022-09-01 PROCEDURE — 25000003 PHARM REV CODE 250: Performed by: PHYSICIAN ASSISTANT

## 2022-09-01 PROCEDURE — 87502 INFLUENZA DNA AMP PROBE: CPT

## 2022-09-01 PROCEDURE — 99283 EMERGENCY DEPT VISIT LOW MDM: CPT | Mod: 25

## 2022-09-01 PROCEDURE — U0002 COVID-19 LAB TEST NON-CDC: HCPCS | Performed by: STUDENT IN AN ORGANIZED HEALTH CARE EDUCATION/TRAINING PROGRAM

## 2022-09-01 RX ORDER — ONDANSETRON 8 MG/1
8 TABLET, ORALLY DISINTEGRATING ORAL
Status: COMPLETED | OUTPATIENT
Start: 2022-09-01 | End: 2022-09-01

## 2022-09-01 RX ORDER — ACETAMINOPHEN 500 MG
1000 TABLET ORAL
Status: COMPLETED | OUTPATIENT
Start: 2022-09-01 | End: 2022-09-01

## 2022-09-01 RX ADMIN — ACETAMINOPHEN 1000 MG: 500 TABLET, FILM COATED ORAL at 07:09

## 2022-09-01 RX ADMIN — ONDANSETRON 8 MG: 8 TABLET, ORALLY DISINTEGRATING ORAL at 07:09

## 2022-09-01 NOTE — ED PROVIDER NOTES
Encounter Date: 9/1/2022       History     Chief Complaint   Patient presents with    Dizziness    Fatigue    Vomiting    Sore Throat     Pt c/o body aches, fatigue, headache, scratchy throat and lightheaded x 2 days. Pt also reports vomiting once yesterday. Pt denies chest pain, sob, n/v/d. Pt reports taking Robitussin with no relief. Pt 27 weeks pregnant.     22-year-old female, 27 weeks gravid, no pertinent past medical history, presents to the emergency department for 3 day history of URI symptoms that include cough associated with nasal congestion, and scratchy sore throat.  Also notes associated positional presyncopal dizziness that resolves at rest.  Currently denies dizziness while sitting in the ED.  Denies syncope.  States she has history of similar symptoms during 2nd trimester of pregnancy.  Notes several episodes of post-tussive emesis yesterday with no emesis today.  Tolerating p.o..  Denies abdominal pain, vaginal bleeding, urinary symptoms, headache, chest pain, and shortness of breath.  No medication prior to arrival.    The history is provided by the patient.   Review of patient's allergies indicates:  No Known Allergies  History reviewed. No pertinent past medical history.  History reviewed. No pertinent surgical history.  Family History   Problem Relation Age of Onset    Cancer Neg Hx      Social History     Tobacco Use    Smoking status: Former     Types: Cigarettes    Smokeless tobacco: Never    Tobacco comments:     black and milds daily   Substance Use Topics    Alcohol use: Yes     Comment: occasionally    Drug use: Not Currently     Types: Marijuana     Comment: daily     Review of Systems   Constitutional:  Negative for fever.   HENT:  Positive for congestion. Negative for ear pain, sore throat, trouble swallowing and voice change.    Respiratory:  Positive for cough. Negative for shortness of breath and wheezing.    Cardiovascular:  Negative for chest pain.   Gastrointestinal:  Positive  for vomiting (Resolved). Negative for abdominal pain, diarrhea and nausea.   Genitourinary:  Negative for dysuria, flank pain, frequency and urgency.   Musculoskeletal:  Negative for back pain and neck pain.   Skin:  Negative for rash.   Neurological:  Positive for dizziness (Positional.  Currently resolved.). Negative for headaches.   All other systems reviewed and are negative.    Physical Exam     Initial Vitals [09/01/22 0709]   BP Pulse Resp Temp SpO2   (!) 96/54 (!) 122 19 99 °F (37.2 °C) 96 %      MAP       --         Physical Exam    Nursing note and vitals reviewed.  Constitutional: She appears well-developed and well-nourished. She is not diaphoretic. No distress.   HENT:   Head: Atraumatic.   Right Ear: External ear normal.   Left Ear: External ear normal.   Dry mucous membranes.  Nasal congestion present   Eyes: Conjunctivae and EOM are normal.   Neck: No tracheal deviation present.   Normal range of motion.  Cardiovascular:  Regular rhythm.   Tachycardia present.         Pulmonary/Chest: No accessory muscle usage or stridor. No tachypnea. No respiratory distress.   Abdominal:   Gravid abdomen   Musculoskeletal:         General: No edema. Normal range of motion.      Cervical back: Normal range of motion.     Neurological: She is alert and oriented to person, place, and time. She displays no tremor. She displays no seizure activity. Coordination and gait normal.   Skin: Skin is intact. Capillary refill takes less than 2 seconds. No rash noted. No erythema.       ED Course   Procedures  Labs Reviewed   POCT INFLUENZA A/B MOLECULAR   SARS-COV-2 RDRP GENE   POCT STREP A MOLECULAR   POCT GLUCOSE     EKG Readings: (Independently Interpreted)   Initial Reading: No STEMI. Rhythm: Sinus Tachycardia. Heart Rate: 103. Axis: Normal.     Imaging Results    None          Medications   acetaminophen tablet 1,000 mg (1,000 mg Oral Given 9/1/22 0732)   ondansetron disintegrating tablet 8 mg (8 mg Oral Given 9/1/22 0732)      Medical Decision Making:   History:   Old Medical Records: I decided to obtain old medical records.  ED Management:  Twenty-seven weeks gravid.  No symptoms concerning for precipitous delivery/miscarriage.  Normal fetal heart tones.  URI symptoms suspicious for viral process versus allergic rhinitis associated with postnasal drip.  COVID-19, flu, and rapid strep negative.  No hypoxia or respiratory distress.  Low suspicion for bacterial pneumonia.  Tolerating p.o..  She does appear dehydrated and is orthostatic via heart rate.  She has had similar symptoms during 2nd trimester of pregnancy.  Denies syncope.  EKG reassuring.  Glucose normal.  Recommended hydration and patient feels comfortable hydrating herself at home.  Advising follow-up with PCP. Strict return precautions discussed.  Agreeable to plan.                    Clinical Impression:   Final diagnoses:  [R42] Orthostatic dizziness (Primary)  [Z3A.27] 27 weeks gestation of pregnancy  [E86.0] Mild dehydration  [R09.81] Nasal congestion  [R05.9] Cough  [R09.82] Post-nasal drip        ED Disposition Condition    Discharge Stable          ED Prescriptions    None       Follow-up Information       Follow up With Specialties Details Why Contact Info    Stacy Dobbins MD Obstetrics and Gynecology Schedule an appointment as soon as possible for a visit in 1 day For further evaluation, For more definitive management of your symptoms [address] [facility] [place], [place] [address]  [address]  [facility] [address]  [phone]      [facility] [place] - Emergency Dept Emergency Medicine Go to  If symptoms worsen [address]  [facility] [address] [phone]             Davon Oliver PA-C  09/01/22 0801

## 2022-09-01 NOTE — DISCHARGE INSTRUCTIONS

## 2022-09-01 NOTE — ED TRIAGE NOTES
Pt reports to ED reports generalized body aches, fatigue, headache, cough with scratchy throat and lightheaded x 3 days. Pt also reports vomiting once yesterday.   Pt denies chest pain, sob, bladder/bowel problems.  Pt reports taking Robitussin with no relief.  Pt 27 weeks pregnant.   Pt AAOX4.

## 2022-09-01 NOTE — Clinical Note
Aramis Catalan accompanied their caregiver to the emergency department on 9/1/2022. They may return to work on 09/02/2022.      If you have any questions or concerns, please don't hesitate to call.      Davon Oliver PA-C

## 2022-11-12 ENCOUNTER — HOSPITAL ENCOUNTER (OUTPATIENT)
Facility: HOSPITAL | Age: 22
Discharge: LEFT BEFORE DISCHARGE | End: 2022-11-12
Attending: OBSTETRICS & GYNECOLOGY | Admitting: OBSTETRICS & GYNECOLOGY
Payer: MEDICAID

## 2022-11-12 DIAGNOSIS — Z34.90 PREGNANCY: ICD-10-CM

## 2022-11-12 LAB — RUPTURE OF MEMBRANE: NEGATIVE

## 2022-11-12 PROCEDURE — 84112 EVAL AMNIOTIC FLUID PROTEIN: CPT | Performed by: OBSTETRICS & GYNECOLOGY

## 2022-11-12 PROCEDURE — 99211 OFF/OP EST MAY X REQ PHY/QHP: CPT

## 2022-11-12 PROCEDURE — 25000003 PHARM REV CODE 250: Performed by: OBSTETRICS & GYNECOLOGY

## 2022-11-12 PROCEDURE — 63600175 PHARM REV CODE 636 W HCPCS: Performed by: OBSTETRICS & GYNECOLOGY

## 2022-11-12 RX ORDER — ONDANSETRON 8 MG/1
8 TABLET, ORALLY DISINTEGRATING ORAL EVERY 8 HOURS PRN
Status: DISCONTINUED | OUTPATIENT
Start: 2022-11-12 | End: 2022-11-13 | Stop reason: HOSPADM

## 2022-11-12 RX ORDER — SODIUM CHLORIDE, SODIUM LACTATE, POTASSIUM CHLORIDE, CALCIUM CHLORIDE 600; 310; 30; 20 MG/100ML; MG/100ML; MG/100ML; MG/100ML
INJECTION, SOLUTION INTRAVENOUS CONTINUOUS
Status: DISCONTINUED | OUTPATIENT
Start: 2022-11-12 | End: 2022-11-13 | Stop reason: HOSPADM

## 2022-11-12 RX ORDER — ACETAMINOPHEN 500 MG
500 TABLET ORAL EVERY 6 HOURS PRN
Status: DISCONTINUED | OUTPATIENT
Start: 2022-11-12 | End: 2022-11-12

## 2022-11-12 RX ORDER — PROCHLORPERAZINE EDISYLATE 5 MG/ML
5 INJECTION INTRAMUSCULAR; INTRAVENOUS EVERY 6 HOURS PRN
Status: DISCONTINUED | OUTPATIENT
Start: 2022-11-12 | End: 2022-11-13 | Stop reason: HOSPADM

## 2022-11-12 RX ORDER — ACETAMINOPHEN 500 MG
1000 TABLET ORAL EVERY 8 HOURS PRN
Status: DISCONTINUED | OUTPATIENT
Start: 2022-11-13 | End: 2022-11-13 | Stop reason: HOSPADM

## 2022-11-12 RX ADMIN — SODIUM CHLORIDE, SODIUM LACTATE, POTASSIUM CHLORIDE, AND CALCIUM CHLORIDE: .6; .31; .03; .02 INJECTION, SOLUTION INTRAVENOUS at 11:11

## 2022-11-12 RX ADMIN — ACETAMINOPHEN 1000 MG: 500 TABLET ORAL at 11:11

## 2022-11-13 VITALS
DIASTOLIC BLOOD PRESSURE: 75 MMHG | SYSTOLIC BLOOD PRESSURE: 129 MMHG | RESPIRATION RATE: 20 BRPM | HEART RATE: 79 BPM | TEMPERATURE: 98 F | OXYGEN SATURATION: 99 %

## 2022-11-13 PROCEDURE — 59025 FETAL NON-STRESS TEST: CPT

## 2022-11-13 NOTE — NURSING
Pt states she has not had any water to drink today. Pt given PO hydration. Unable to finish drinking. IV fluids offered and accepted.

## 2022-11-13 NOTE — NURSING
"POC reviewed with pt. Pt states"I cant do this any longer" Pt accepted tylenol. Pt requesting to leave.   AMA forms signed by pt, this nurse and MAME Burns RN. Pt verbalized understanding that leaving could result in fetal death, and maternal death.   [id number] significant other wheeled pt off unit.  "

## 2022-11-13 NOTE — NURSING
Call placed to Dr Mathis. ALAN, ctx pattern, pts vitals reviewed with MD. Orders to send pt home after IV fluids, Tylenol 1G and ctx stop. RBV

## 2022-11-13 NOTE — NURSING
Pt presents to triage with c/o SROM. SVE 1cm external os; internal os closed. No fluid noted on glove. ROM plus collected and sent to lab. Pt is unreferred, has [facility] with [facility].

## 2023-06-13 ENCOUNTER — PATIENT MESSAGE (OUTPATIENT)
Dept: RESEARCH | Facility: HOSPITAL | Age: 23
End: 2023-06-13
Payer: MEDICAID

## 2023-06-27 ENCOUNTER — PATIENT MESSAGE (OUTPATIENT)
Dept: RESEARCH | Facility: HOSPITAL | Age: 23
End: 2023-06-27
Payer: MEDICAID

## 2024-02-28 ENCOUNTER — HOSPITAL ENCOUNTER (EMERGENCY)
Facility: HOSPITAL | Age: 24
Discharge: HOME | End: 2024-02-28
Attending: EMERGENCY MEDICINE
Payer: MEDICAID

## 2024-02-28 VITALS
BODY MASS INDEX: 26.41 KG/M2 | HEART RATE: 83 BPM | HEIGHT: 59 IN | SYSTOLIC BLOOD PRESSURE: 116 MMHG | DIASTOLIC BLOOD PRESSURE: 54 MMHG | OXYGEN SATURATION: 100 % | RESPIRATION RATE: 16 BRPM | TEMPERATURE: 98 F | WEIGHT: 131 LBS

## 2024-02-28 DIAGNOSIS — Z71.1 PHYSICALLY WELL BUT WORRIED: Primary | ICD-10-CM

## 2024-02-28 LAB
B-HCG UR QL: POSITIVE
BACTERIA #/AREA URNS HPF: ABNORMAL /HPF
BILIRUB UR QL STRIP: NEGATIVE
CLARITY UR: ABNORMAL
COLOR UR: YELLOW
CTP QC/QA: YES
GLUCOSE UR QL STRIP: NEGATIVE
HGB UR QL STRIP: NEGATIVE
HYALINE CASTS #/AREA URNS LPF: 1 /LPF
KETONES UR QL STRIP: NEGATIVE
LEUKOCYTE ESTERASE UR QL STRIP: ABNORMAL
MICROSCOPIC COMMENT: ABNORMAL
NITRITE UR QL STRIP: NEGATIVE
PH UR STRIP: 7 [PH] (ref 5–8)
PROT UR QL STRIP: NEGATIVE
RBC #/AREA URNS HPF: 1 /HPF (ref 0–4)
SP GR UR STRIP: 1.02 (ref 1–1.03)
SQUAMOUS #/AREA URNS HPF: 17 /HPF
URN SPEC COLLECT METH UR: ABNORMAL
UROBILINOGEN UR STRIP-ACNC: NEGATIVE EU/DL
WBC #/AREA URNS HPF: 6 /HPF (ref 0–5)

## 2024-02-28 PROCEDURE — 81000 URINALYSIS NONAUTO W/SCOPE: CPT | Performed by: EMERGENCY MEDICINE

## 2024-02-28 PROCEDURE — 99282 EMERGENCY DEPT VISIT SF MDM: CPT

## 2024-02-28 PROCEDURE — 81025 URINE PREGNANCY TEST: CPT | Performed by: EMERGENCY MEDICINE

## 2024-02-28 NOTE — ED PROVIDER NOTES
"Encounter Date: 2024       History     Chief Complaint   Patient presents with    Vaginal Discharge     Pt presents to ED c/o vaginal discharge, described as "wetness in my panties," pta.  Pt reports frequent urination.  Denies denies abd pain, n/v, vaginal bleeding or any other symptoms.  Pain 0/10.   Pt reports being 7 weeks pregnant.       Patient is a 24-year-old female A1 approximately 7 weeks pregnant who presents to the emergency department with concern for miscarriage.  Patient reports she woke up in the night and felt wetness in her pain Ts.  Reports she has had no vaginal discharge or vaginal bleeding.  Denies pelvic cramping.  Reports she has had a miscarriage in the past so she was very concerned.  Was hoping we could check on the baby.  Denies any urinary symptoms.    The history is provided by the patient.     Review of patient's allergies indicates:  No Known Allergies  No past medical history on file.  No past surgical history on file.  Family History   Problem Relation Age of Onset    Cancer Neg Hx      Social History     Tobacco Use    Smoking status: Former     Types: Cigarettes    Smokeless tobacco: Never    Tobacco comments:     black and milds daily   Substance Use Topics    Alcohol use: Yes     Comment: occasionally    Drug use: Not Currently     Types: Marijuana     Comment: daily     Review of Systems   Constitutional:  Negative for activity change, appetite change, chills, fatigue and fever.   HENT:  Negative for congestion, ear discharge, ear pain, postnasal drip, rhinorrhea and sore throat.    Respiratory:  Negative for cough.    Cardiovascular:  Negative for chest pain.   Gastrointestinal:  Negative for abdominal pain.   Genitourinary:  Negative for dysuria.   Musculoskeletal:  Negative for back pain.   Skin:  Negative for rash and wound.   Neurological:  Negative for dizziness, light-headedness and headaches.       Physical Exam     Initial Vitals [24 0127]   BP Pulse " Resp Temp SpO2   (!) 116/54 83 16 98.4 °F (36.9 °C) 100 %      MAP       --         Physical Exam    Nursing note and vitals reviewed.  Constitutional: She appears well-developed and well-nourished. She is not diaphoretic.  Non-toxic appearance. No distress.   HENT:   Head: Normocephalic.   Right Ear: External ear normal.   Left Ear: External ear normal.   Mouth/Throat: Oropharynx is clear and moist.   Eyes: Conjunctivae are normal. Pupils are equal, round, and reactive to light.   Neck:   Normal range of motion.  Cardiovascular:  Normal rate and regular rhythm.           Pulmonary/Chest: Breath sounds normal.   Abdominal: Abdomen is soft. Bowel sounds are normal. There is no abdominal tenderness.   Musculoskeletal:         General: Normal range of motion.      Cervical back: Normal range of motion.     Neurological: She is alert and oriented to person, place, and time.   Skin: Skin is warm. Capillary refill takes less than 2 seconds.   Psychiatric: She has a normal mood and affect.         ED Course   Procedures  Labs Reviewed   POCT URINE PREGNANCY - Abnormal; Notable for the following components:       Result Value    POC Preg Test, Ur Positive (*)     All other components within normal limits   URINALYSIS, REFLEX TO URINE CULTURE   URINALYSIS MICROSCOPIC          Imaging Results    None          Medications - No data to display  Medical Decision Making  Urgent evaluation of a 24-year-old female A1 approximately 7 weeks pregnant who presents to the emergency department worried.  Patient is afebrile and nontoxic appearing.  Benign belly.  Not complaining of vaginal discharge or bleeding.  No pelvic cramping.  Saw her OBGYN today and had confirmed IUP with fetal heart tones.  She woke up in the night and felt dampness in her panties.  She is requesting we check on baby.  Bedside ultrasound is performed showing IUP with cardiac activity.  Patient is reassured and feels much better.  She is advised to follow up  at our next scheduled OBGYN appointment.  Advised to return to the emergency department with any worsening symptoms or concerns.                                      Clinical Impression:  Final diagnoses:  [Z71.1] Physically well but worried (Primary)          ED Disposition Condition    Discharge Stable          ED Prescriptions    None       Follow-up Information       Follow up With Specialties Details Why Contact Info    Dr. Maureen Soliz   at scheduled appt              Derrell-JuveBan serrano PA-C  02/28/24 0218